# Patient Record
Sex: FEMALE | Race: WHITE | NOT HISPANIC OR LATINO
[De-identification: names, ages, dates, MRNs, and addresses within clinical notes are randomized per-mention and may not be internally consistent; named-entity substitution may affect disease eponyms.]

---

## 2017-01-06 VITALS
RESPIRATION RATE: 18 BRPM | OXYGEN SATURATION: 97 % | HEIGHT: 63 IN | HEART RATE: 73 BPM | SYSTOLIC BLOOD PRESSURE: 121 MMHG | TEMPERATURE: 98 F | DIASTOLIC BLOOD PRESSURE: 71 MMHG | WEIGHT: 197.98 LBS

## 2017-01-06 NOTE — ASU PATIENT PROFILE, ADULT - PSH
H/O arthroscopy of right knee    H/O breast surgery  right, benign H/O arthroscopy of right knee    H/O breast surgery  left

## 2017-01-08 ENCOUNTER — FORM ENCOUNTER (OUTPATIENT)
Age: 67
End: 2017-01-08

## 2017-01-09 ENCOUNTER — OUTPATIENT (OUTPATIENT)
Dept: OUTPATIENT SERVICES | Facility: HOSPITAL | Age: 67
LOS: 1 days | Discharge: HOME | End: 2017-01-09
Payer: COMMERCIAL

## 2017-01-09 VITALS
DIASTOLIC BLOOD PRESSURE: 77 MMHG | RESPIRATION RATE: 12 BRPM | OXYGEN SATURATION: 96 % | HEART RATE: 72 BPM | SYSTOLIC BLOOD PRESSURE: 123 MMHG

## 2017-01-09 DIAGNOSIS — Z98.890 OTHER SPECIFIED POSTPROCEDURAL STATES: Chronic | ICD-10-CM

## 2017-01-09 PROCEDURE — 88341 IMHCHEM/IMCYTCHM EA ADD ANTB: CPT

## 2017-01-09 PROCEDURE — 19285 PERQ DEV BREAST 1ST US IMAG: CPT | Mod: LT

## 2017-01-09 PROCEDURE — 19125 EXCISION BREAST LESION: CPT | Mod: LT,GC

## 2017-01-09 PROCEDURE — G0206: CPT | Mod: 26

## 2017-01-09 PROCEDURE — 76098 X-RAY EXAM SURGICAL SPECIMEN: CPT

## 2017-01-09 PROCEDURE — 19125 EXCISION BREAST LESION: CPT | Mod: LT

## 2017-01-09 PROCEDURE — C1819: CPT

## 2017-01-09 PROCEDURE — 76098 X-RAY EXAM SURGICAL SPECIMEN: CPT | Mod: 26

## 2017-01-09 PROCEDURE — 19285 PERQ DEV BREAST 1ST US IMAG: CPT

## 2017-01-09 PROCEDURE — 77065 DX MAMMO INCL CAD UNI: CPT

## 2017-01-09 PROCEDURE — 88307 TISSUE EXAM BY PATHOLOGIST: CPT

## 2017-01-09 RX ORDER — ONDANSETRON 8 MG/1
4 TABLET, FILM COATED ORAL ONCE
Qty: 0 | Refills: 0 | Status: DISCONTINUED | OUTPATIENT
Start: 2017-01-09 | End: 2017-01-09

## 2017-01-11 DIAGNOSIS — D24.2 BENIGN NEOPLASM OF LEFT BREAST: ICD-10-CM

## 2017-01-11 DIAGNOSIS — N60.12 DIFFUSE CYSTIC MASTOPATHY OF LEFT BREAST: ICD-10-CM

## 2017-01-11 LAB — SURGICAL PATHOLOGY STUDY: SIGNIFICANT CHANGE UP

## 2017-01-13 ENCOUNTER — RESULT REVIEW (OUTPATIENT)
Age: 67
End: 2017-01-13

## 2017-01-18 ENCOUNTER — APPOINTMENT (OUTPATIENT)
Dept: BREAST CENTER | Facility: CLINIC | Age: 67
End: 2017-01-18

## 2017-01-18 VITALS — TEMPERATURE: 88 F | SYSTOLIC BLOOD PRESSURE: 127 MMHG | DIASTOLIC BLOOD PRESSURE: 88 MMHG

## 2017-01-18 DIAGNOSIS — R92.8 OTHER ABNORMAL AND INCONCLUSIVE FINDINGS ON DIAGNOSTIC IMAGING OF BREAST: ICD-10-CM

## 2017-01-18 DIAGNOSIS — N63 UNSPECIFIED LUMP IN BREAST: ICD-10-CM

## 2017-02-20 PROBLEM — E03.9 HYPOTHYROIDISM, UNSPECIFIED: Chronic | Status: ACTIVE | Noted: 2017-01-06

## 2017-02-20 PROBLEM — M85.80 OTHER SPECIFIED DISORDERS OF BONE DENSITY AND STRUCTURE, UNSPECIFIED SITE: Chronic | Status: ACTIVE | Noted: 2017-01-06

## 2017-02-20 PROBLEM — I10 ESSENTIAL (PRIMARY) HYPERTENSION: Chronic | Status: ACTIVE | Noted: 2017-01-06

## 2017-05-08 ENCOUNTER — FORM ENCOUNTER (OUTPATIENT)
Age: 67
End: 2017-05-08

## 2017-05-09 ENCOUNTER — OUTPATIENT (OUTPATIENT)
Dept: OUTPATIENT SERVICES | Facility: HOSPITAL | Age: 67
LOS: 1 days | End: 2017-05-09
Payer: COMMERCIAL

## 2017-05-09 DIAGNOSIS — Z98.890 OTHER SPECIFIED POSTPROCEDURAL STATES: Chronic | ICD-10-CM

## 2017-05-09 PROCEDURE — 76641 ULTRASOUND BREAST COMPLETE: CPT | Mod: 26,50

## 2017-05-09 PROCEDURE — 76641 ULTRASOUND BREAST COMPLETE: CPT

## 2017-07-11 ENCOUNTER — APPOINTMENT (OUTPATIENT)
Dept: BREAST CENTER | Facility: CLINIC | Age: 67
End: 2017-07-11

## 2017-07-11 VITALS
TEMPERATURE: 96.9 F | WEIGHT: 195 LBS | HEIGHT: 62 IN | HEART RATE: 78 BPM | BODY MASS INDEX: 35.88 KG/M2 | SYSTOLIC BLOOD PRESSURE: 131 MMHG | DIASTOLIC BLOOD PRESSURE: 88 MMHG | RESPIRATION RATE: 16 BRPM

## 2017-07-11 DIAGNOSIS — Z12.39 ENCOUNTER FOR OTHER SCREENING FOR MALIGNANT NEOPLASM OF BREAST: ICD-10-CM

## 2017-12-18 ENCOUNTER — FORM ENCOUNTER (OUTPATIENT)
Age: 67
End: 2017-12-18

## 2017-12-19 ENCOUNTER — OUTPATIENT (OUTPATIENT)
Dept: OUTPATIENT SERVICES | Facility: HOSPITAL | Age: 67
LOS: 1 days | End: 2017-12-19
Payer: COMMERCIAL

## 2017-12-19 DIAGNOSIS — Z98.890 OTHER SPECIFIED POSTPROCEDURAL STATES: Chronic | ICD-10-CM

## 2017-12-19 PROCEDURE — 76641 ULTRASOUND BREAST COMPLETE: CPT

## 2017-12-19 PROCEDURE — 77066 DX MAMMO INCL CAD BI: CPT

## 2017-12-19 PROCEDURE — 76641 ULTRASOUND BREAST COMPLETE: CPT | Mod: 26,50

## 2017-12-19 PROCEDURE — G0204: CPT | Mod: 26

## 2019-02-05 ENCOUNTER — APPOINTMENT (OUTPATIENT)
Dept: MAMMOGRAPHY | Facility: HOSPITAL | Age: 69
End: 2019-02-05

## 2019-05-22 ENCOUNTER — APPOINTMENT (OUTPATIENT)
Dept: MAMMOGRAPHY | Facility: HOSPITAL | Age: 69
End: 2019-05-22
Payer: MEDICARE

## 2019-05-22 ENCOUNTER — OUTPATIENT (OUTPATIENT)
Dept: OUTPATIENT SERVICES | Facility: HOSPITAL | Age: 69
LOS: 1 days | End: 2019-05-22
Payer: MEDICARE

## 2019-05-22 DIAGNOSIS — Z98.890 OTHER SPECIFIED POSTPROCEDURAL STATES: Chronic | ICD-10-CM

## 2019-05-22 PROCEDURE — 77067 SCR MAMMO BI INCL CAD: CPT

## 2019-05-22 PROCEDURE — 77063 BREAST TOMOSYNTHESIS BI: CPT | Mod: 26

## 2019-05-22 PROCEDURE — 77067 SCR MAMMO BI INCL CAD: CPT | Mod: 26

## 2019-05-22 PROCEDURE — 77063 BREAST TOMOSYNTHESIS BI: CPT

## 2019-06-06 ENCOUNTER — OUTPATIENT (OUTPATIENT)
Dept: OUTPATIENT SERVICES | Facility: HOSPITAL | Age: 69
LOS: 1 days | End: 2019-06-06
Payer: MEDICARE

## 2019-06-06 ENCOUNTER — APPOINTMENT (OUTPATIENT)
Dept: MAMMOGRAPHY | Facility: HOSPITAL | Age: 69
End: 2019-06-06
Payer: MEDICARE

## 2019-06-06 ENCOUNTER — APPOINTMENT (OUTPATIENT)
Dept: ULTRASOUND IMAGING | Facility: HOSPITAL | Age: 69
End: 2019-06-06
Payer: MEDICARE

## 2019-06-06 DIAGNOSIS — Z98.890 OTHER SPECIFIED POSTPROCEDURAL STATES: Chronic | ICD-10-CM

## 2019-06-06 PROCEDURE — 76641 ULTRASOUND BREAST COMPLETE: CPT | Mod: 26,50

## 2019-06-06 PROCEDURE — 76641 ULTRASOUND BREAST COMPLETE: CPT

## 2019-06-06 PROCEDURE — 77066 DX MAMMO INCL CAD BI: CPT | Mod: 26

## 2019-06-06 PROCEDURE — 77066 DX MAMMO INCL CAD BI: CPT

## 2019-09-13 ENCOUNTER — OUTPATIENT (OUTPATIENT)
Dept: OUTPATIENT SERVICES | Facility: HOSPITAL | Age: 69
LOS: 1 days | End: 2019-09-13
Payer: MEDICARE

## 2019-09-13 ENCOUNTER — RESULT REVIEW (OUTPATIENT)
Age: 69
End: 2019-09-13

## 2019-09-13 ENCOUNTER — APPOINTMENT (OUTPATIENT)
Dept: MAMMOGRAPHY | Facility: HOSPITAL | Age: 69
End: 2019-09-13
Payer: MEDICARE

## 2019-09-13 DIAGNOSIS — Z98.890 OTHER SPECIFIED POSTPROCEDURAL STATES: Chronic | ICD-10-CM

## 2019-09-13 PROCEDURE — 88305 TISSUE EXAM BY PATHOLOGIST: CPT

## 2019-09-13 PROCEDURE — 19082 BX BREAST ADD LESION STRTCTC: CPT | Mod: LT

## 2019-09-13 PROCEDURE — 19082 BX BREAST ADD LESION STRTCTC: CPT | Mod: RT

## 2019-09-13 PROCEDURE — 19082 BX BREAST ADD LESION STRTCTC: CPT

## 2019-09-13 PROCEDURE — 19081 BX BREAST 1ST LESION STRTCTC: CPT | Mod: LT

## 2019-09-13 PROCEDURE — 19081 BX BREAST 1ST LESION STRTCTC: CPT

## 2019-09-13 PROCEDURE — A4648: CPT

## 2019-09-16 LAB — SURGICAL PATHOLOGY STUDY: SIGNIFICANT CHANGE UP

## 2019-09-25 ENCOUNTER — APPOINTMENT (OUTPATIENT)
Dept: BREAST CENTER | Facility: CLINIC | Age: 69
End: 2019-09-25
Payer: MEDICARE

## 2019-09-25 DIAGNOSIS — R92.8 OTHER ABNORMAL AND INCONCLUSIVE FINDINGS ON DIAGNOSTIC IMAGING OF BREAST: ICD-10-CM

## 2019-09-25 DIAGNOSIS — Z78.9 OTHER SPECIFIED HEALTH STATUS: ICD-10-CM

## 2019-09-25 PROCEDURE — 99213 OFFICE O/P EST LOW 20 MIN: CPT

## 2019-09-25 NOTE — PHYSICAL EXAM
[Normocephalic] : normocephalic [Atraumatic] : atraumatic [Supple] : supple [No Supraclavicular Adenopathy] : no supraclavicular adenopathy [Examined in the supine and seated position] : examined in the supine and seated position [No Nipple Retraction] : no left nipple retraction [No Nipple Discharge] : no right nipple discharge [No Axillary Lymphadenopathy] : no left axillary lymphadenopathy [No Edema] : no edema [No Rashes] : no rashes [No Ulceration] : no ulceration

## 2019-10-01 ENCOUNTER — OUTPATIENT (OUTPATIENT)
Dept: OUTPATIENT SERVICES | Facility: HOSPITAL | Age: 69
LOS: 1 days | End: 2019-10-01

## 2019-10-01 ENCOUNTER — APPOINTMENT (OUTPATIENT)
Dept: MRI IMAGING | Facility: CLINIC | Age: 69
End: 2019-10-01
Payer: MEDICARE

## 2019-10-01 DIAGNOSIS — Z98.890 OTHER SPECIFIED POSTPROCEDURAL STATES: Chronic | ICD-10-CM

## 2019-10-01 PROCEDURE — 77049 MRI BREAST C-+ W/CAD BI: CPT | Mod: 26

## 2019-10-03 NOTE — REASON FOR VISIT
[Consultation] : a consultation visit [FreeTextEntry1] : new left breast sclerosing intraductal papilloma found on screening mammogram, hx of atypia

## 2019-10-03 NOTE — HISTORY OF PRESENT ILLNESS
[FreeTextEntry1] : Yovana is a 69 yo female, previously under the care of Dr. Sood, presents for a new finding of left breast sclerosed intraductal papilloma found on screening mammogram. She is without complaint. Patient does know that she has nodular breasts and does self breast exams. ETHAN 42%\par \par She has a past medical history of left breast excisional biopsy in 2017 for atypia, path revealed papilloma.\par She tried Tamoxifen but at that time she had a complicated course of thyroid disease and decided to stop the Tamoxifen.\par

## 2019-10-03 NOTE — PAST MEDICAL HISTORY
[Menopause Age____] : age at menopause was [unfilled] [Live Births ___] : P[unfilled]  [Menarche Age ____] : age at menarche was [unfilled] [Age At Live Birth ___] : Age at live birth: [unfilled] [Total Preg ___] : G[unfilled] [History of Hormone Replacement Treatment] : has no history of hormone replacement treatment [FreeTextEntry7] : none

## 2019-10-08 ENCOUNTER — CHART COPY (OUTPATIENT)
Age: 69
End: 2019-10-08

## 2019-10-08 PROBLEM — R92.8 ABNORMAL FINDING ON RADIOLOGICAL EXAMINATION OF BREAST: Status: ACTIVE | Noted: 2019-10-08

## 2019-10-28 ENCOUNTER — CHART COPY (OUTPATIENT)
Age: 69
End: 2019-10-28

## 2019-11-04 ENCOUNTER — APPOINTMENT (OUTPATIENT)
Dept: ULTRASOUND IMAGING | Facility: HOSPITAL | Age: 69
End: 2019-11-04
Payer: MEDICARE

## 2019-11-04 ENCOUNTER — OUTPATIENT (OUTPATIENT)
Dept: OUTPATIENT SERVICES | Facility: HOSPITAL | Age: 69
LOS: 1 days | End: 2019-11-04
Payer: MEDICARE

## 2019-11-04 DIAGNOSIS — Z98.890 OTHER SPECIFIED POSTPROCEDURAL STATES: Chronic | ICD-10-CM

## 2019-11-04 PROCEDURE — 76642 ULTRASOUND BREAST LIMITED: CPT | Mod: 26,50

## 2019-11-04 PROCEDURE — 76642 ULTRASOUND BREAST LIMITED: CPT

## 2019-11-05 ENCOUNTER — RESULT REVIEW (OUTPATIENT)
Age: 69
End: 2019-11-05

## 2019-11-20 ENCOUNTER — CHART COPY (OUTPATIENT)
Age: 69
End: 2019-11-20

## 2019-12-02 ENCOUNTER — OUTPATIENT (OUTPATIENT)
Dept: OUTPATIENT SERVICES | Facility: HOSPITAL | Age: 69
LOS: 1 days | End: 2019-12-02
Payer: MEDICARE

## 2019-12-02 ENCOUNTER — APPOINTMENT (OUTPATIENT)
Dept: MAMMOGRAPHY | Facility: HOSPITAL | Age: 69
End: 2019-12-02
Payer: MEDICARE

## 2019-12-02 DIAGNOSIS — Z98.890 OTHER SPECIFIED POSTPROCEDURAL STATES: Chronic | ICD-10-CM

## 2019-12-02 PROCEDURE — 19281 PERQ DEVICE BREAST 1ST IMAG: CPT | Mod: LT

## 2019-12-02 PROCEDURE — 19281 PERQ DEVICE BREAST 1ST IMAG: CPT

## 2019-12-02 PROCEDURE — C1739: CPT

## 2019-12-05 VITALS
DIASTOLIC BLOOD PRESSURE: 83 MMHG | RESPIRATION RATE: 16 BRPM | WEIGHT: 193.57 LBS | HEART RATE: 73 BPM | SYSTOLIC BLOOD PRESSURE: 129 MMHG | HEIGHT: 63 IN | TEMPERATURE: 97 F | OXYGEN SATURATION: 97 %

## 2019-12-05 NOTE — ASU PATIENT PROFILE, ADULT - PSH
H/O arthroscopy of right knee    H/O breast surgery  left Cataract of both eyes, unspecified cataract type  lens implant  H/O arthroscopy of right knee    H/O breast surgery  left

## 2019-12-06 ENCOUNTER — OUTPATIENT (OUTPATIENT)
Dept: OUTPATIENT SERVICES | Facility: HOSPITAL | Age: 69
LOS: 1 days | Discharge: HOME | End: 2019-12-06
Payer: MEDICARE

## 2019-12-06 ENCOUNTER — APPOINTMENT (OUTPATIENT)
Dept: BREAST CENTER | Facility: CLINIC | Age: 69
End: 2019-12-06

## 2019-12-06 ENCOUNTER — RESULT REVIEW (OUTPATIENT)
Age: 69
End: 2019-12-06

## 2019-12-06 VITALS
DIASTOLIC BLOOD PRESSURE: 78 MMHG | SYSTOLIC BLOOD PRESSURE: 148 MMHG | RESPIRATION RATE: 16 BRPM | OXYGEN SATURATION: 91 % | HEART RATE: 68 BPM

## 2019-12-06 DIAGNOSIS — H26.9 UNSPECIFIED CATARACT: Chronic | ICD-10-CM

## 2019-12-06 DIAGNOSIS — Z98.890 OTHER SPECIFIED POSTPROCEDURAL STATES: Chronic | ICD-10-CM

## 2019-12-06 PROCEDURE — 76098 X-RAY EXAM SURGICAL SPECIMEN: CPT

## 2019-12-06 PROCEDURE — 19120 REMOVAL OF BREAST LESION: CPT | Mod: LT

## 2019-12-06 PROCEDURE — 76098 X-RAY EXAM SURGICAL SPECIMEN: CPT | Mod: 26

## 2019-12-06 PROCEDURE — 88307 TISSUE EXAM BY PATHOLOGIST: CPT

## 2019-12-06 PROCEDURE — 88307 TISSUE EXAM BY PATHOLOGIST: CPT | Mod: 26

## 2019-12-06 PROCEDURE — 19125 EXCISION BREAST LESION: CPT | Mod: LT,GC

## 2019-12-06 RX ORDER — ACETAMINOPHEN 500 MG
1000 TABLET ORAL ONCE
Refills: 0 | Status: COMPLETED | OUTPATIENT
Start: 2019-12-06 | End: 2019-12-06

## 2019-12-06 RX ORDER — OXYCODONE AND ACETAMINOPHEN 5; 325 MG/1; MG/1
1 TABLET ORAL ONCE
Refills: 0 | Status: DISCONTINUED | OUTPATIENT
Start: 2019-12-06 | End: 2019-12-06

## 2019-12-06 RX ADMIN — OXYCODONE AND ACETAMINOPHEN 1 TABLET(S): 5; 325 TABLET ORAL at 19:41

## 2019-12-06 RX ADMIN — Medication 1000 MILLIGRAM(S): at 14:33

## 2019-12-06 NOTE — BRIEF OPERATIVE NOTE - OPERATION/FINDINGS
Left breast lumpectomy with jing , clip identified in specimen. Breast closed with deep dermal sutures.

## 2019-12-06 NOTE — PACU DISCHARGE NOTE - COMMENTS
discharge instructions given to patient who verbalized understanding, pain controlled with regimen, cleared by anesthesiologist for discharge home.

## 2019-12-10 LAB — SURGICAL PATHOLOGY STUDY: SIGNIFICANT CHANGE UP

## 2019-12-11 ENCOUNTER — APPOINTMENT (OUTPATIENT)
Dept: BREAST CENTER | Facility: CLINIC | Age: 69
End: 2019-12-11
Payer: MEDICARE

## 2019-12-11 VITALS
TEMPERATURE: 98.3 F | OXYGEN SATURATION: 95 % | SYSTOLIC BLOOD PRESSURE: 120 MMHG | DIASTOLIC BLOOD PRESSURE: 84 MMHG | HEART RATE: 107 BPM

## 2019-12-11 PROCEDURE — 99024 POSTOP FOLLOW-UP VISIT: CPT

## 2019-12-11 NOTE — DATA REVIEWED
[FreeTextEntry1] : --6/6/2019 ([facility]) b/l mmg/US: BI-RADS 4\par \par --12/6/19 ([facility]) left breast excisional biopsy pathology biopsy site changes, focal stromal fibrosis, UDH, cystic change, no residual tumor seen, left breast tissue inferior to lesion pathology cystic change with apocrine metaplasia, UDH, focal sclerosing adenosis.

## 2019-12-11 NOTE — HISTORY OF PRESENT ILLNESS
[FreeTextEntry1] : Yovana is a 68 yo female presents for post-op follow up of left intraductal papilloma s/p left breast excisional biopsy on 12/6/19.  Final surgical pathology revealed benign findings with biopsy site changes. \par Patient doing well. No postoperative issues. Denies fever and chills.\par \par

## 2020-05-06 ENCOUNTER — APPOINTMENT (OUTPATIENT)
Dept: BREAST CENTER | Facility: CLINIC | Age: 70
End: 2020-05-06
Payer: MEDICARE

## 2020-05-06 PROCEDURE — 99213 OFFICE O/P EST LOW 20 MIN: CPT | Mod: 95

## 2020-05-11 NOTE — HISTORY OF PRESENT ILLNESS
[FreeTextEntry1] : 68 yo female presents for follow up of left intraductal papilloma s/p left breast excisional biopsy on 12/6/19 Final surgical pathology revealed benign findings with biopsy site changes. Also presents for high risk surveillance. \par \par Patient doing well. No postoperative issues. Denies fever and chills. Patient has no breast complaints. \par \par Discussed with patient that a physical exam is an important part of the visit and that we will be scheduling an appointment for the physical exam in the next couple of months. Patient states that she is hesitant until it truly feels safe in regards to COVID19.

## 2020-07-20 ENCOUNTER — APPOINTMENT (OUTPATIENT)
Dept: BREAST CENTER | Facility: CLINIC | Age: 70
End: 2020-07-20
Payer: MEDICARE

## 2020-07-20 PROCEDURE — 99212 OFFICE O/P EST SF 10 MIN: CPT | Mod: 95

## 2020-07-22 NOTE — PAST MEDICAL HISTORY
[Menarche Age ____] : age at menarche was [unfilled] [Menopause Age____] : age at menopause was [unfilled] [Age At Live Birth ___] : Age at live birth: [unfilled] [Total Preg ___] : G[unfilled] [Live Births ___] : P[unfilled]  [History of Hormone Replacement Treatment] : has no history of hormone replacement treatment [FreeTextEntry7] : none

## 2020-07-22 NOTE — REASON FOR VISIT
[Home] : at home, [unfilled] , at the time of the visit. [Follow-Up: _____] : a [unfilled] follow-up visit [Other Location: e.g. Home (Enter Location, City,State)___] : at [unfilled] [Verbal consent obtained from patient] : the patient, [unfilled]

## 2020-07-22 NOTE — DATA REVIEWED
[FreeTextEntry1] : --6/6/2019 ([facility]) b/l mmg/US: BI-RADS 4\par \par -- 10/1/19 ([facility]) MRI breasts: 0.6cm enhancing mass in the right breast at 6:00, recommend US w/ biopsy vs MRI biopsy. Foci of non-mass enhancement in the left breast at 5-6:00, consider MRI guided biopsy. Non-mass enhancement surround the biopsy clips in the left breast, likely due to post-biopsy change. BI-RADS 4A. \par \par -- 11/4/19 ([facility]) B/l targeted US: unremarkable US images of both breasts, no US abnormality seen to correspond with lesions seen on MRI. MRI biopsy is recommended. \par \par --12/6/19 ([facility]) left breast excisional biopsy pathology biopsy site changes, focal stromal fibrosis, UDH, cystic change, no residual tumor seen, left breast tissue inferior to lesion pathology cystic change with apocrine metaplasia, UDH, focal sclerosing adenosis. \par \par Plan is to f/u on lesions seen on previous MRI (biopsy was recommended) from October with MRI imaging in 6-months (due now)?? \par

## 2020-07-22 NOTE — HISTORY OF PRESENT ILLNESS
[FreeTextEntry1] : 68 yo female presents for follow up Telehealth visit of left intraductal papilloma s/p left breast excisional biopsy on 12/6/19 Final surgical pathology revealed benign findings with biopsy site changes. Also presents for high risk surveillance. Patient has been hesitant for physical exam follow up secondary to COVID-19. Previously discussed that the physical exam is an essential part of her visit and is indicated.\par \par Patient is also hesitant about imaging, discussed with her that the facilities have protocols in place to keep patients safe. Patient understands that she is overdue for imaging, and is willing to come in eventually for a physical exam.\par \par ETHAN 42%

## 2020-08-24 ENCOUNTER — RESULT REVIEW (OUTPATIENT)
Age: 70
End: 2020-08-24

## 2020-08-24 ENCOUNTER — APPOINTMENT (OUTPATIENT)
Dept: BREAST CENTER | Facility: CLINIC | Age: 70
End: 2020-08-24
Payer: MEDICARE

## 2020-08-24 ENCOUNTER — APPOINTMENT (OUTPATIENT)
Dept: MAMMOGRAPHY | Facility: HOSPITAL | Age: 70
End: 2020-08-24
Payer: MEDICARE

## 2020-08-24 ENCOUNTER — OUTPATIENT (OUTPATIENT)
Dept: OUTPATIENT SERVICES | Facility: HOSPITAL | Age: 70
LOS: 1 days | End: 2020-08-24
Payer: MEDICARE

## 2020-08-24 ENCOUNTER — APPOINTMENT (OUTPATIENT)
Dept: ULTRASOUND IMAGING | Facility: HOSPITAL | Age: 70
End: 2020-08-24
Payer: MEDICARE

## 2020-08-24 VITALS
WEIGHT: 190 LBS | DIASTOLIC BLOOD PRESSURE: 72 MMHG | HEIGHT: 62 IN | HEART RATE: 92 BPM | SYSTOLIC BLOOD PRESSURE: 121 MMHG | TEMPERATURE: 97.2 F | OXYGEN SATURATION: 97 % | BODY MASS INDEX: 34.96 KG/M2 | RESPIRATION RATE: 20 BRPM

## 2020-08-24 DIAGNOSIS — Z98.890 OTHER SPECIFIED POSTPROCEDURAL STATES: Chronic | ICD-10-CM

## 2020-08-24 DIAGNOSIS — H26.9 UNSPECIFIED CATARACT: Chronic | ICD-10-CM

## 2020-08-24 PROCEDURE — 99213 OFFICE O/P EST LOW 20 MIN: CPT

## 2020-08-24 PROCEDURE — 77066 DX MAMMO INCL CAD BI: CPT

## 2020-08-24 PROCEDURE — G0279: CPT | Mod: 26

## 2020-08-24 PROCEDURE — 76641 ULTRASOUND BREAST COMPLETE: CPT | Mod: 26,50

## 2020-08-24 PROCEDURE — G0279: CPT

## 2020-08-24 PROCEDURE — 76641 ULTRASOUND BREAST COMPLETE: CPT

## 2020-08-24 PROCEDURE — 77066 DX MAMMO INCL CAD BI: CPT | Mod: 26

## 2020-08-27 NOTE — PHYSICAL EXAM
[Normocephalic] : normocephalic [Atraumatic] : atraumatic [Supple] : supple [No Supraclavicular Adenopathy] : no supraclavicular adenopathy [Examined in the supine and seated position] : examined in the supine and seated position [No dominant masses] : no dominant masses in right breast  [No dominant masses] : no dominant masses left breast [No Nipple Retraction] : no left nipple retraction [No Nipple Discharge] : no left nipple discharge [No Axillary Lymphadenopathy] : no left axillary lymphadenopathy [No Ulceration] : no ulceration [No Rashes] : no rashes [No Edema] : no edema

## 2020-09-21 ENCOUNTER — RESULT REVIEW (OUTPATIENT)
Age: 70
End: 2020-09-21

## 2020-09-21 ENCOUNTER — OUTPATIENT (OUTPATIENT)
Dept: OUTPATIENT SERVICES | Facility: HOSPITAL | Age: 70
LOS: 1 days | End: 2020-09-21
Payer: MEDICARE

## 2020-09-21 ENCOUNTER — APPOINTMENT (OUTPATIENT)
Dept: MAMMOGRAPHY | Facility: HOSPITAL | Age: 70
End: 2020-09-21
Payer: MEDICARE

## 2020-09-21 DIAGNOSIS — H26.9 UNSPECIFIED CATARACT: Chronic | ICD-10-CM

## 2020-09-21 DIAGNOSIS — Z98.890 OTHER SPECIFIED POSTPROCEDURAL STATES: Chronic | ICD-10-CM

## 2020-09-21 PROCEDURE — 88305 TISSUE EXAM BY PATHOLOGIST: CPT | Mod: 26

## 2020-09-21 PROCEDURE — 19081 BX BREAST 1ST LESION STRTCTC: CPT | Mod: RT

## 2020-09-21 PROCEDURE — 88305 TISSUE EXAM BY PATHOLOGIST: CPT

## 2020-09-21 PROCEDURE — 19081 BX BREAST 1ST LESION STRTCTC: CPT

## 2020-09-21 PROCEDURE — A4648: CPT

## 2020-09-21 PROCEDURE — 77065 DX MAMMO INCL CAD UNI: CPT

## 2020-09-21 PROCEDURE — 77065 DX MAMMO INCL CAD UNI: CPT | Mod: 26,RT

## 2020-09-22 LAB — SURGICAL PATHOLOGY STUDY: SIGNIFICANT CHANGE UP

## 2020-09-24 NOTE — HISTORY OF PRESENT ILLNESS
[FreeTextEntry1] : Yovana is a 70 yo female presents for physical exam appointment s/p telehealth, for history of left intraductal papilloma s/p left breast excisional biopsy on 12/6/19 Final surgical pathology revealed benign findings with biopsy site changes. Also presents for high risk surveillance. Patient has no breast complaints today. Denies nipple discharge, nipple/breast skin changes or dimpling. Denies fever and chills.\par \par She had her appointment for a b/l mmg and US at [facility] today. Patient understands and will go forward with the biopsy.\par \par ETHAN lifetime risk is 42%

## 2020-09-24 NOTE — PAST MEDICAL HISTORY
[History of Hormone Replacement Treatment] : has no history of hormone replacement treatment [FreeTextEntry7] : none

## 2020-09-24 NOTE — DATA REVIEWED
[FreeTextEntry1] : --6/6/2019 ([facility]) b/l mmg/US: BI-RADS 4\par \par -- 10/1/19 ([facility]) MRI breasts: 0.6cm enhancing mass in the right breast at 6:00, recommend US w/ biopsy vs MRI biopsy. Foci of non-mass enhancement in the left breast at 5-6:00, consider MRI guided biopsy. Non-mass enhancement surround the biopsy clips in the left breast, likely due to post-biopsy change. BI-RADS 4A. \par \par -- 11/4/19 ([facility]) B/l targeted US: unremarkable US images of both breasts, no US abnormality seen to correspond with lesions seen on MRI. MRI biopsy is recommended. \par \par --12/6/19 ([facility]) left breast excisional biopsy pathology biopsy site changes, focal stromal fibrosis, UDH, cystic change, no residual tumor seen, left breast tissue inferior to lesion pathology cystic change with apocrine metaplasia, UDH, focal sclerosing adenosis. \par \par

## 2020-09-25 ENCOUNTER — APPOINTMENT (OUTPATIENT)
Dept: VASCULAR SURGERY | Facility: CLINIC | Age: 70
End: 2020-09-25
Payer: MEDICARE

## 2020-09-25 DIAGNOSIS — I87.2 VENOUS INSUFFICIENCY (CHRONIC) (PERIPHERAL): ICD-10-CM

## 2020-09-25 DIAGNOSIS — I80.3 PHLEBITIS AND THROMBOPHLEBITIS OF LOWER EXTREMITIES, UNSPECIFIED: ICD-10-CM

## 2020-09-25 PROCEDURE — 93971 EXTREMITY STUDY: CPT

## 2020-09-25 PROCEDURE — 99203 OFFICE O/P NEW LOW 30 MIN: CPT

## 2020-09-25 NOTE — ADDENDUM
[FreeTextEntry1] : This note was written by Jeane Partida on 09/25/2020 acting as scribe for Hemant Bunch M.D.\par \par I, Hemant Bunch, have read and attest that all the information, medical decision making and discharge instructions within are true and accurate. 
84

## 2020-09-25 NOTE — ASSESSMENT
[FreeTextEntry1] : 70 y/o F presents for initial evaluation of RLE thrombophlebitis. Upon examination: Bulging varicose veins over the R thigh through the R calf. \par RLE doppler done at the office today: Negative DVT, Positive SVT chronic VV in the calf, GSV Reflux. \par Discussed with patient her symptoms are secondary to varicose veins. At this point RLE RFA warranted, discussed risks and benefits. All questions made by the patient were answered to her satisfaction. Procedure to be scheduled in 6 weeks. Discussed possible RLE stab phlebectomy in the near future . Once procedure approved by insurance will proceed accordingly. \par

## 2020-09-25 NOTE — PHYSICAL EXAM
[Respiratory Effort] : normal respiratory effort [Normal Rate and Rhythm] : normal rate and rhythm [Alert] : alert [Oriented to Person] : oriented to person [Oriented to Place] : oriented to place [Oriented to Time] : oriented to time [Calm] : calm [Varicose Veins Of Lower Extremities] : present [Varicose Veins Of The Right Leg] : of the right leg [Ankle Swelling On The Left] : moderate [JVD] : no jugular venous distention  [Abdomen Tenderness] : ~T ~M No abdominal tenderness [de-identified] : Calm, cooperative [de-identified] : NC/AT [de-identified] : FROM + [de-identified] : Bulging varicose veins over the R thigh through the R calf.

## 2020-09-25 NOTE — PROCEDURE
[FreeTextEntry1] : RLE doppler done at the office today: Negative DVT, Positive SVT chronic VV in the calf, GSV Reflux.

## 2020-09-25 NOTE — HISTORY OF PRESENT ILLNESS
[FreeTextEntry1] : 68 y/o F w/hx HLD, HTN and Left intraductal papilloma s//p Left breast excisional biopsy s/p benign findings presents for initial evaluation of thrombophlebitis of the RLE x 2 months ago.  Patient was referred by Dr. Zurita to r/o underlying DVT. She was placed on baby aspirin but had to stop for L breast biopsy x 1 week. \par \par Denies: rest pain, skin changes, ulcerations. \par \par SHx:phakectomy,

## 2020-11-11 ENCOUNTER — APPOINTMENT (OUTPATIENT)
Dept: BREAST CENTER | Facility: CLINIC | Age: 70
End: 2020-11-11

## 2020-12-07 ENCOUNTER — APPOINTMENT (OUTPATIENT)
Dept: VASCULAR SURGERY | Facility: CLINIC | Age: 70
End: 2020-12-07
Payer: MEDICARE

## 2020-12-07 PROCEDURE — 36475 ENDOVENOUS RF 1ST VEIN: CPT | Mod: RT

## 2020-12-07 NOTE — PROCEDURE
[FreeTextEntry1] : RLE RFA [FreeTextEntry2] : RLE GSV reflux  [FreeTextEntry3] : Surgeon: Hemant Roberts MD\par \par Assistant: Rupa Singh RVT\par \par Pre-Op Diagnosis:  Incompetent Right Greater Saphenous Vein\par \par Post-Op Diagnosis:  Same\par \par Procedure:  Transcatheter Occlusion of Right Greater Saphenous Vein using a Radio- Frequency Thermal Ablation (VNUS) ClosureFAST Catheter.\par \par Anesthesia: Local \par \par History: Symptomatic varicose veins\par \par Findings:  Complete occlusion of greater saphenous vein at end of procedure.\par \par Procedure: The patient’s leg was prepped and draped.  Under local 1% Lidocaine the greater saphenous vein was punctured below the knee with a micropuncture needle and then the guidewire was inserted into the vein.  This was performed under ultrasound guidance.  The skin was incised with a #11 Blade, a dilator was inserted and then the 7 Fr. Introducer was placed into the greater saphenous vein.  \par \par The fossa ovalis was visualized with the Duplex scan.  The common femoral vein was confirmed to be patent.  Duplex examination of the greater saphenous vein from the calf to the groin was performed.  The greater saphenous and inferior epigastric vein were identified and the VNUS catheter was introduced through the introducer and into the vein up to the fossa ovalis.  It was positioned 3 cm distal to the inferior epigastric vein.\par \par Tumescent solution (400 cc normal saline, 4cc of 8.4% Sodium bicarbonate and 40 cc 1% Lidoaine) was infiltrated subfascially over the vein.  \par \par The VNUS ClosureFAST catheter checked for proper function.  Thermal ablation was begun after the position of the catheter was once again confirmed to be 3 cm distal to the inferior epigastric branch of the greater saphenous vein. The proximal 7 cm was treated twice and then the rest of the vein was treated with single 20 sec cycles. The sheath and catheter were removed. Compression was applied for hemostasis.    \par \par Confirmation of common femoral vein patency and occlusion of the greater saphenous vein was made with duplex ultrasonography.  \par \par The leg was wrapped with gauze and Ace Bandages.  The patient remained on the table until alert and was then comfortable ambulating.           \par \par

## 2020-12-07 NOTE — ADDENDUM
[FreeTextEntry1] : This note was written by Jeane Partida on 12/07/2020 acting as scribe for Hemant Waggoner M.D.\par \par I, Hemant Bunch have read and attest that all the information, medical decision making and discharge instructions within are true and accurate.

## 2020-12-11 ENCOUNTER — APPOINTMENT (OUTPATIENT)
Dept: VASCULAR SURGERY | Facility: CLINIC | Age: 70
End: 2020-12-11
Payer: MEDICARE

## 2020-12-11 PROCEDURE — 99213 OFFICE O/P EST LOW 20 MIN: CPT

## 2020-12-11 PROCEDURE — 93971 EXTREMITY STUDY: CPT

## 2020-12-11 NOTE — PROCEDURE
[FreeTextEntry1] : RLE venous US shows: Negative DVT. GSV closed s/p RFA. 2 areas of SVT noted along closed GSV.

## 2020-12-11 NOTE — HISTORY OF PRESENT ILLNESS
[FreeTextEntry1] : 70 y/o F w/hx HLD, HTN and Left intraductal papilloma s//p Left breast excisional biopsy s/p benign s/p right breast core needle biopsy pending results, RLE SVT resolved, R GSV reflux s/p RLE RFA on 12/7/2020 presents for follow up evaluation. Patient initially referred by Dr. Zurita for evaluation. Pt reports she has been feeling some mild discomfort over the RLE mid thigh, but is very happy with the outcome. Otherwise pt reports she has been staying active around the house. Denies: rest pain, ulcerations. \par \par SHx:phakectomy

## 2020-12-11 NOTE — ASSESSMENT
[FreeTextEntry1] : 72 y/o F with RLE SVT now resolved, R GSV reflux s/p RLE RFA on 12/7/2020 with recurrent RLE SVT  presents for follow up evaluation. On exam, R thigh: residual ecchymosis over the R mid thigh, palpable cords over the mid thigh tender to touch. \par RLE venous US shows: Negative DVT. GSV closed s/p RFA. 2 areas of SVT noted along closed GSV. \par \par Plan: \par - Apply warm compress / heating pad 3-4 times a day\par - Aleve or Tylenol PRN for pain\par - She will f/u with us here with repeat RLE scans in 1 month. \par - Patient to contact the office in case she develops any concerning symptoms. \par  [Arterial/Venous Disease] : arterial/venous disease

## 2020-12-11 NOTE — PHYSICAL EXAM
[Respiratory Effort] : normal respiratory effort [Normal Rate and Rhythm] : normal rate and rhythm [Varicose Veins Of Lower Extremities] : present [Varicose Veins Of The Right Leg] : of the right leg [Ankle Swelling On The Left] : moderate [Alert] : alert [Oriented to Person] : oriented to person [Oriented to Place] : oriented to place [Oriented to Time] : oriented to time [Calm] : calm [JVD] : no jugular venous distention  [Abdomen Tenderness] : ~T ~M No abdominal tenderness [de-identified] : Calm, cooperative [de-identified] : NC/AT [de-identified] : FROM + [de-identified] : R thigh: residual ecchymosis over the R mid thigh, palpable cords along closed GSV tender to touch.

## 2020-12-11 NOTE — ADDENDUM
[FreeTextEntry1] : This note was written by Jeaen Partida on 12/11/2020 acting as scribe for Hemant Waggoner M.D.\par \par I, Hemant Bunch have read and attest that all the information, medical decision making and discharge instructions within are true and accurate.

## 2021-01-11 ENCOUNTER — APPOINTMENT (OUTPATIENT)
Dept: VASCULAR SURGERY | Facility: CLINIC | Age: 71
End: 2021-01-11
Payer: MEDICARE

## 2021-01-11 PROCEDURE — 99212 OFFICE O/P EST SF 10 MIN: CPT

## 2021-01-11 NOTE — ADDENDUM
[FreeTextEntry1] : This note was written by Jeane Partida on 01/11/2021 acting as scribe for Hemant Waggoner M.D.\par \par I, Hemant Bunch have read and attest that all the information, medical decision making and discharge instructions within are true and accurate.

## 2021-01-11 NOTE — PHYSICAL EXAM
[Respiratory Effort] : normal respiratory effort [Normal Rate and Rhythm] : normal rate and rhythm [Alert] : alert [Oriented to Person] : oriented to person [Oriented to Place] : oriented to place [Oriented to Time] : oriented to time [Calm] : calm [2+] : left 2+ [Varicose Veins Of Lower Extremities] : bilaterally [Ankle Swelling On The Right] : mild [No Rash or Lesion] : No rash or lesion [JVD] : no jugular venous distention  [Abdomen Tenderness] : ~T ~M No abdominal tenderness [de-identified] : Calm, cooperative [de-identified] : NC/AT [de-identified] : FROM + [de-identified] : R thigh: puncture site healed with no evidence of active drainage, no surrounding erythema. Feet are pink, toes are warm to touch with adequate capillary refill.

## 2021-01-11 NOTE — HISTORY OF PRESENT ILLNESS
[FreeTextEntry1] : 69 y/o F w/hx HLD, HTN and Left intraductal papilloma s/p Left breast excisional biopsy s/p benign s/p right breast core needle biopsy pending results, RLE SVT resolved, R GSV reflux s/p RLE RFA on 12/7/2020 presents for follow up evaluation. Pt reports she has been feeling well overall today, she notice a small induration to the medial aspect of her R thigh but is non tender and has no surrounding erythema. She is very happy with the outcome and has been staying active with walking.  Denies: rest pain, ulcerations. \par

## 2021-01-26 ENCOUNTER — NON-APPOINTMENT (OUTPATIENT)
Age: 71
End: 2021-01-26

## 2021-02-08 ENCOUNTER — APPOINTMENT (OUTPATIENT)
Dept: BREAST CENTER | Facility: CLINIC | Age: 71
End: 2021-02-08
Payer: MEDICARE

## 2021-02-08 VITALS — BODY MASS INDEX: 34.96 KG/M2 | HEART RATE: 111 BPM | HEIGHT: 62 IN | WEIGHT: 190 LBS | OXYGEN SATURATION: 98 %

## 2021-02-08 PROCEDURE — 99213 OFFICE O/P EST LOW 20 MIN: CPT

## 2021-02-08 RX ORDER — VALACYCLOVIR 1 G/1
1 TABLET, FILM COATED ORAL
Qty: 20 | Refills: 0 | Status: ACTIVE | COMMUNITY
Start: 2020-09-23

## 2021-02-11 NOTE — DATA REVIEWED
[FreeTextEntry1] : 8/24/2020 ([facility]) bilateral screening mammogram showing scattered areas of fibroglandular density, 0.6cm mass with associated pleomorphic microcalcifications in LOQ right breast no sonographic correlate visualized recommended for stereotactic biopsy. Small group of probably benign microcalcifications 12:00 right breast anterior third one of which may be new from prior study, additional biopsy or wire localization should be based on pathology. Benign mammographic findings in left breast, negative left breast US. BIRADS 4B \par \par 9/21/2020 right breast LOQ mmt biopsy pathology intraductal papilloma with epithelial microcalcifications, breast tissue adjacent to papilloma shows fiber elastotic stroma and UDH \par

## 2021-02-11 NOTE — REASON FOR VISIT
[Follow-Up: _____] : a [unfilled] follow-up visit [FreeTextEntry1] : new right LOQ intraductal papilloma with possible adjacent sclerosing lesion, h/o left intraductal papilloma and ADH

## 2021-02-11 NOTE — PAST MEDICAL HISTORY
[Menarche Age ____] : age at menarche was [unfilled] [Menopause Age____] : age at menopause was [unfilled] [Total Preg ___] : G[unfilled] [Live Births ___] : P[unfilled]  [Age At Live Birth ___] : Age at live birth: [unfilled] [History of Hormone Replacement Treatment] : has no history of hormone replacement treatment [FreeTextEntry7] : none

## 2021-02-11 NOTE — HISTORY OF PRESENT ILLNESS
[FreeTextEntry1] : 69 yo female presents for follow up with new right LOQ intraductal papilloma found on screening mammogram s/p stereotactic biopsy on 9/21/2020; the findings of the biopsy are suggestive of a sclerosing lesion adjacent to the papilloma. She has a history of left intraductal papilloma s/p left breast excisional biopsy on 12/6/19 and history of left breast atypical ductal hyperplasia s/p excisional biopsy in 2007. An excisional biopsy of right breast was recommended given the new finding of an intraductal papilloma; however, Yovana is reluctant to proceed with surgery at this time secondary to the COVID-19 pandemic. Patient has no breast complaints today. Denies nipple discharge, nipple/breast skin changes or dimpling. Denies fever and chills.\par \par Of note she was caring for her mother who ultimately passed away in September 2021. She reports losing 10lbs over a 2 month period of time and reports hair loss starting in September 2021, she was referred to a hematologist/oncologist.  \par \par ETHAN lifetime risk of 42%.

## 2021-02-23 NOTE — ASU PATIENT PROFILE, ADULT - NUMBER OF YRS
Jasen,    Please reach out to the pharmacy to check on order as patient's Botox did not arrive    Thank you    Umberto Car 20

## 2021-03-10 ENCOUNTER — RESULT REVIEW (OUTPATIENT)
Age: 71
End: 2021-03-10

## 2021-03-10 ENCOUNTER — APPOINTMENT (OUTPATIENT)
Dept: MRI IMAGING | Facility: HOSPITAL | Age: 71
End: 2021-03-10

## 2021-03-10 ENCOUNTER — APPOINTMENT (OUTPATIENT)
Dept: MAMMOGRAPHY | Facility: HOSPITAL | Age: 71
End: 2021-03-10

## 2021-03-10 ENCOUNTER — OUTPATIENT (OUTPATIENT)
Dept: OUTPATIENT SERVICES | Facility: HOSPITAL | Age: 71
LOS: 1 days | End: 2021-03-10
Payer: MEDICARE

## 2021-03-10 ENCOUNTER — APPOINTMENT (OUTPATIENT)
Dept: ULTRASOUND IMAGING | Facility: HOSPITAL | Age: 71
End: 2021-03-10

## 2021-03-10 DIAGNOSIS — Z98.890 OTHER SPECIFIED POSTPROCEDURAL STATES: Chronic | ICD-10-CM

## 2021-03-10 DIAGNOSIS — H26.9 UNSPECIFIED CATARACT: Chronic | ICD-10-CM

## 2021-03-10 PROCEDURE — 77049 MRI BREAST C-+ W/CAD BI: CPT | Mod: 26,MG

## 2021-03-10 PROCEDURE — C8908: CPT

## 2021-03-10 PROCEDURE — G1004: CPT

## 2021-03-10 PROCEDURE — 76641 ULTRASOUND BREAST COMPLETE: CPT

## 2021-03-10 PROCEDURE — 76641 ULTRASOUND BREAST COMPLETE: CPT | Mod: 26,RT

## 2021-03-10 PROCEDURE — 77065 DX MAMMO INCL CAD UNI: CPT | Mod: 26,RT

## 2021-03-10 PROCEDURE — A9585: CPT

## 2021-03-10 PROCEDURE — G0279: CPT

## 2021-03-10 PROCEDURE — G0279: CPT | Mod: 26

## 2021-03-10 PROCEDURE — 77065 DX MAMMO INCL CAD UNI: CPT

## 2021-03-10 PROCEDURE — C8937: CPT

## 2021-03-16 DIAGNOSIS — R92.8 OTHER ABNORMAL AND INCONCLUSIVE FINDINGS ON DIAGNOSTIC IMAGING OF BREAST: ICD-10-CM

## 2021-03-16 DIAGNOSIS — N63.15 UNSPECIFIED LUMP IN THE RIGHT BREAST, OVERLAPPING QUADRANTS: ICD-10-CM

## 2021-03-19 ENCOUNTER — NON-APPOINTMENT (OUTPATIENT)
Age: 71
End: 2021-03-19

## 2021-09-09 ENCOUNTER — NON-APPOINTMENT (OUTPATIENT)
Age: 71
End: 2021-09-09

## 2021-09-13 ENCOUNTER — APPOINTMENT (OUTPATIENT)
Dept: BREAST CENTER | Facility: CLINIC | Age: 71
End: 2021-09-13
Payer: MEDICARE

## 2021-09-13 VITALS — HEART RATE: 68 BPM | BODY MASS INDEX: 33.13 KG/M2 | WEIGHT: 180 LBS | OXYGEN SATURATION: 95 % | HEIGHT: 62 IN

## 2021-09-13 PROCEDURE — 99213 OFFICE O/P EST LOW 20 MIN: CPT

## 2021-09-13 NOTE — PHYSICAL EXAM
[Normocephalic] : normocephalic [Atraumatic] : atraumatic [Supple] : supple [No Supraclavicular Adenopathy] : no supraclavicular adenopathy [Examined in the supine and seated position] : examined in the supine and seated position [No dominant masses] : no dominant masses in right breast  [No Nipple Retraction] : no left nipple retraction [No Nipple Discharge] : no left nipple discharge [No Axillary Lymphadenopathy] : no left axillary lymphadenopathy [No Edema] : no edema [No Rashes] : no rashes [No Ulceration] : no ulceration [de-identified] : Left breast with 1.5cm well circumscribed, mobile, round nodule at 9-10:00n9 and 5mm well circumscribed, mobile, round nodule in the inferior aspect of the lateral large incision scar

## 2021-09-13 NOTE — HISTORY OF PRESENT ILLNESS
[FreeTextEntry1] : 71 yo female presents for follow up with new right LOQ intraductal papilloma found on screening mammogram s/p stereotactic biopsy on 9/21/2020; the findings of the biopsy are suggestive of a sclerosing lesion adjacent to the papilloma. She has a history of left intraductal papilloma s/p left breast excisional biopsy on 12/6/19 and history of left breast atypical ductal hyperplasia s/p excisional biopsy in 2007. An excisional biopsy of right breast was recommended given the new finding of an intraductal papilloma; however, Yovana is reluctant to proceed with surgery at this time secondary to the COVID-19 pandemic. Patient has no breast complaints today. Denies nipple discharge, nipple/breast skin changes or dimpling. Denies fever and chills.\par \par Of note she was caring for her mother who ultimately passed away in September 2021. She reports losing 10lbs over a 2 month period of time and reports hair loss starting in September 2021, she was referred to a hematologist/oncologist.  \par \par ETHAN lifetime risk of 42%. \par \par Reviewed her imaging from Mar 2021, which was stable. Patient would like to continue with surveillance.

## 2021-09-13 NOTE — DATA REVIEWED
[FreeTextEntry1] : 8/24/2020 ([facility]) bilateral screening mammogram showing scattered areas of fibroglandular density, 0.6cm mass with associated pleomorphic microcalcifications in LOQ right breast no sonographic correlate visualized recommended for stereotactic biopsy. Small group of probably benign microcalcifications 12:00 right breast anterior third one of which may be new from prior study, additional biopsy or wire localization should be based on pathology. Benign mammographic findings in left breast, negative left breast US. BIRADS 4B \par \par 9/21/2020 right breast LOQ mmt biopsy pathology intraductal papilloma with epithelial microcalcifications, breast tissue adjacent to papilloma shows fiber elastotic stroma and UDH \par \par 3/10/2021 ([facility]) right mammogram/US showing Benign mammographic findings.\par 2. 3.6 x 1.6 x 2.5 cm oval parallel, circumscribed mixed isoechoic and hyperechoic lesion at the 3:00 position, 10 cm from the nipple, previously biopsied, pathology proven angiolipoma, unchanged, benign.\par RECOMMENDATION: Resume Annual Mammography on Schedule. BI-RADS 2\par \par 3/10/2021 ([facility]) bilateral breast MRI showing No MRI evidence of malignancy. RECOMMENDATION: MRI in 1 year. BI-RADS 2 \par

## 2021-09-20 ENCOUNTER — RESULT REVIEW (OUTPATIENT)
Age: 71
End: 2021-09-20

## 2021-09-20 ENCOUNTER — APPOINTMENT (OUTPATIENT)
Dept: ULTRASOUND IMAGING | Facility: HOSPITAL | Age: 71
End: 2021-09-20
Payer: MEDICARE

## 2021-09-20 ENCOUNTER — OUTPATIENT (OUTPATIENT)
Dept: OUTPATIENT SERVICES | Facility: HOSPITAL | Age: 71
LOS: 1 days | End: 2021-09-20
Payer: MEDICARE

## 2021-09-20 ENCOUNTER — APPOINTMENT (OUTPATIENT)
Dept: MAMMOGRAPHY | Facility: HOSPITAL | Age: 71
End: 2021-09-20
Payer: MEDICARE

## 2021-09-20 DIAGNOSIS — Z98.890 OTHER SPECIFIED POSTPROCEDURAL STATES: Chronic | ICD-10-CM

## 2021-09-20 DIAGNOSIS — H26.9 UNSPECIFIED CATARACT: Chronic | ICD-10-CM

## 2021-09-20 PROCEDURE — 76641 ULTRASOUND BREAST COMPLETE: CPT

## 2021-09-20 PROCEDURE — 77063 BREAST TOMOSYNTHESIS BI: CPT | Mod: 26

## 2021-09-20 PROCEDURE — 77067 SCR MAMMO BI INCL CAD: CPT

## 2021-09-20 PROCEDURE — 76641 ULTRASOUND BREAST COMPLETE: CPT | Mod: 26,50

## 2021-09-20 PROCEDURE — 77063 BREAST TOMOSYNTHESIS BI: CPT

## 2021-09-20 PROCEDURE — 77067 SCR MAMMO BI INCL CAD: CPT | Mod: 26

## 2021-09-23 ENCOUNTER — NON-APPOINTMENT (OUTPATIENT)
Age: 71
End: 2021-09-23

## 2022-02-24 ENCOUNTER — NON-APPOINTMENT (OUTPATIENT)
Age: 72
End: 2022-02-24

## 2022-02-28 ENCOUNTER — OUTPATIENT (OUTPATIENT)
Dept: OUTPATIENT SERVICES | Facility: HOSPITAL | Age: 72
LOS: 1 days | End: 2022-02-28
Payer: MEDICARE

## 2022-02-28 ENCOUNTER — RESULT REVIEW (OUTPATIENT)
Age: 72
End: 2022-02-28

## 2022-02-28 ENCOUNTER — APPOINTMENT (OUTPATIENT)
Dept: MRI IMAGING | Facility: HOSPITAL | Age: 72
End: 2022-02-28
Payer: MEDICARE

## 2022-02-28 DIAGNOSIS — Z98.890 OTHER SPECIFIED POSTPROCEDURAL STATES: Chronic | ICD-10-CM

## 2022-02-28 DIAGNOSIS — H26.9 UNSPECIFIED CATARACT: Chronic | ICD-10-CM

## 2022-02-28 PROCEDURE — 77049 MRI BREAST C-+ W/CAD BI: CPT | Mod: 26,MH

## 2022-02-28 PROCEDURE — C8908: CPT | Mod: MH

## 2022-02-28 PROCEDURE — A9585: CPT

## 2022-02-28 PROCEDURE — C8937: CPT

## 2022-03-10 ENCOUNTER — NON-APPOINTMENT (OUTPATIENT)
Age: 72
End: 2022-03-10

## 2022-03-30 ENCOUNTER — NON-APPOINTMENT (OUTPATIENT)
Age: 72
End: 2022-03-30

## 2022-04-04 ENCOUNTER — APPOINTMENT (OUTPATIENT)
Dept: BREAST CENTER | Facility: CLINIC | Age: 72
End: 2022-04-04
Payer: MEDICARE

## 2022-04-04 VITALS — BODY MASS INDEX: 34.04 KG/M2 | WEIGHT: 185 LBS | HEART RATE: 43 BPM | HEIGHT: 62 IN | OXYGEN SATURATION: 91 %

## 2022-04-04 PROCEDURE — 99213 OFFICE O/P EST LOW 20 MIN: CPT

## 2022-04-04 NOTE — DATA REVIEWED
[FreeTextEntry1] : 8/24/2020 ([facility]) bilateral screening mammogram showing scattered areas of fibroglandular density, 0.6cm mass with associated pleomorphic microcalcifications in LOQ right breast no sonographic correlate visualized recommended for stereotactic biopsy. Small group of probably benign microcalcifications 12:00 right breast anterior third one of which may be new from prior study, additional biopsy or wire localization should be based on pathology. Benign mammographic findings in left breast, negative left breast US. BIRADS 4B \par \par 9/21/2020 right breast LOQ mmt biopsy pathology intraductal papilloma with epithelial microcalcifications, breast tissue adjacent to papilloma shows fiber elastotic stroma and UDH \par \par 3/10/2021 ([facility]) right mammogram/US showing Benign mammographic findings.\par 2. 3.6 x 1.6 x 2.5 cm oval parallel, circumscribed mixed isoechoic and hyperechoic lesion at the 3:00 position, 10 cm from the nipple, previously biopsied, pathology proven angiolipoma, unchanged, benign.\par RECOMMENDATION: Resume Annual Mammography on Schedule. BI-RADS 2\par \par 3/10/2021 ([facility]) bilateral breast MRI showing No MRI evidence of malignancy. RECOMMENDATION: MRI in 1 year. BI-RADS 2 \par \par 9/20/21: B/L MG & US ([facility])- Fibroglandular. Stable postop changes. Stable asymmetries and scattered benign type calcs. US- R 0.4cm 8:00, 2FN complicated cyst. L 1:00, 8FN postop scarring. BIRADS 2\par \par 2/28/22 ([facility]) bilateral breast MRI showing AXILLA/OTHER: There is no significant axillary or internal mammary lymphadenopathy. IMPRESSION:No MRI evidence of malignancy. RECOMMENDATION: Resume Annual Mammography on Schedule. BI-RADS: BI-RADS 2 - Benign Finding

## 2022-04-04 NOTE — PHYSICAL EXAM
[Normocephalic] : normocephalic [Atraumatic] : atraumatic [Supple] : supple [No Supraclavicular Adenopathy] : no supraclavicular adenopathy [Examined in the supine and seated position] : examined in the supine and seated position [No dominant masses] : no dominant masses in right breast  [No Nipple Retraction] : no left nipple retraction [No Nipple Discharge] : no left nipple discharge [No Axillary Lymphadenopathy] : no left axillary lymphadenopathy [No Edema] : no edema [No Rashes] : no rashes [No Ulceration] : no ulceration [de-identified] : Left breast with 1.5cm well circumscribed, mobile, round nodule at 9-10:00n9 and 5mm well circumscribed, mobile, round nodule in the inferior aspect of the lateral large incision scar stable

## 2022-04-04 NOTE — HISTORY OF PRESENT ILLNESS
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 The lesion is undergoing monitoring per patient preference. Patient has no breast complaints today. Denies nipple discharge, nipple/breast skin changes or dimpling. Denies fever and chills.\par \par Of note she was caring for her mother who ultimately passed away in September 2021. She reports losing 10lbs over a 2 month period of time and reports hair loss starting in September 2021, she was referred to a hematologist/oncologist.  \par \par ETHAN lifetime risk of 42%. \par \par  She has a history of left intraductal papilloma s/p left breast excisional biopsy on 12/6/19 and history of left breast atypical ductal hyperplasia s/p excisional biopsy in 2007. An excisional biopsy of right breast was recommended given the new finding of an intraductal papilloma; however, Yovana is reluctant to proceed with surgery, still declining surgery. Have also recommended chemoprophylaxis in the past with a medical oncologist however the patient declined.\par \par Patient's PCP sent breast cancer markers Ca 15-3 35H and Ca 27-29 43H. Discussed that these are non specific and can be due to non cancerous breast changes which she has currently. Discussed that her most up to date breast imaging is benign. Recommended that she follow up with her pcp if he would like to trend those markers, patient states that she does not want these markers to be trended and she would like to continue imaging surveillance and conservative management at this time.

## 2022-09-23 ENCOUNTER — NON-APPOINTMENT (OUTPATIENT)
Age: 72
End: 2022-09-23

## 2022-10-03 ENCOUNTER — RESULT REVIEW (OUTPATIENT)
Age: 72
End: 2022-10-03

## 2022-10-03 ENCOUNTER — APPOINTMENT (OUTPATIENT)
Dept: ULTRASOUND IMAGING | Facility: HOSPITAL | Age: 72
End: 2022-10-03

## 2022-10-03 ENCOUNTER — APPOINTMENT (OUTPATIENT)
Dept: MAMMOGRAPHY | Facility: HOSPITAL | Age: 72
End: 2022-10-03

## 2022-10-03 ENCOUNTER — OUTPATIENT (OUTPATIENT)
Dept: OUTPATIENT SERVICES | Facility: HOSPITAL | Age: 72
LOS: 1 days | End: 2022-10-03
Payer: MEDICARE

## 2022-10-03 DIAGNOSIS — Z98.890 OTHER SPECIFIED POSTPROCEDURAL STATES: Chronic | ICD-10-CM

## 2022-10-03 DIAGNOSIS — H26.9 UNSPECIFIED CATARACT: Chronic | ICD-10-CM

## 2022-10-03 PROCEDURE — 77063 BREAST TOMOSYNTHESIS BI: CPT

## 2022-10-03 PROCEDURE — 77063 BREAST TOMOSYNTHESIS BI: CPT | Mod: 26

## 2022-10-03 PROCEDURE — 76641 ULTRASOUND BREAST COMPLETE: CPT

## 2022-10-03 PROCEDURE — 77067 SCR MAMMO BI INCL CAD: CPT

## 2022-10-03 PROCEDURE — 76641 ULTRASOUND BREAST COMPLETE: CPT | Mod: 26,50

## 2022-10-03 PROCEDURE — 77067 SCR MAMMO BI INCL CAD: CPT | Mod: 26

## 2022-10-06 ENCOUNTER — APPOINTMENT (OUTPATIENT)
Dept: BREAST CENTER | Facility: CLINIC | Age: 72
End: 2022-10-06

## 2022-10-06 VITALS — HEIGHT: 62 IN | WEIGHT: 183 LBS | BODY MASS INDEX: 33.68 KG/M2

## 2022-10-06 DIAGNOSIS — Z78.9 OTHER SPECIFIED HEALTH STATUS: ICD-10-CM

## 2022-10-06 DIAGNOSIS — M10.9 GOUT, UNSPECIFIED: ICD-10-CM

## 2022-10-06 PROCEDURE — 99213 OFFICE O/P EST LOW 20 MIN: CPT

## 2022-10-06 RX ORDER — ALLOPURINOL 200 MG/1
TABLET ORAL
Refills: 0 | Status: ACTIVE | COMMUNITY

## 2022-10-06 RX ORDER — ASPIRIN 81 MG
81 TABLET, DELAYED RELEASE (ENTERIC COATED) ORAL
Refills: 0 | Status: ACTIVE | COMMUNITY

## 2022-10-06 RX ORDER — AZITHROMYCIN 250 MG/1
250 TABLET, FILM COATED ORAL
Refills: 0 | Status: COMPLETED | COMMUNITY
Start: 2020-08-13 | End: 2022-10-06

## 2022-10-10 NOTE — PAST MEDICAL HISTORY
[Menarche Age ____] : age at menarche was [unfilled] [Menopause Age____] : age at menopause was [unfilled] [Total Preg ___] : G[unfilled] [Live Births ___] : P[unfilled]  [Age At Live Birth ___] : Age at live birth: [unfilled] [History of Hormone Replacement Treatment] : has no history of hormone replacement treatment [FreeTextEntry6] : no [FreeTextEntry7] : none [FreeTextEntry8] : yes

## 2022-10-10 NOTE — DATA REVIEWED
[FreeTextEntry1] : 8/24/2020 ([facility]) bilateral screening mammogram showing scattered areas of fibroglandular density, 0.6cm mass with associated pleomorphic microcalcifications in LOQ right breast no sonographic correlate visualized recommended for stereotactic biopsy. Small group of probably benign microcalcifications 12:00 right breast anterior third one of which may be new from prior study, additional biopsy or wire localization should be based on pathology. Benign mammographic findings in left breast, negative left breast US. BIRADS 4B \par \par 9/21/2020 right breast LOQ mmt biopsy pathology intraductal papilloma with epithelial microcalcifications, breast tissue adjacent to papilloma shows fiber elastotic stroma and UDH \par \par 3/10/2021 ([facility]) right mammogram/US showing Benign mammographic findings.\par 2. 3.6 x 1.6 x 2.5 cm oval parallel, circumscribed mixed isoechoic and hyperechoic lesion at the 3:00 position, 10 cm from the nipple, previously biopsied, pathology proven angiolipoma, unchanged, benign.\par RECOMMENDATION: Resume Annual Mammography on Schedule. BI-RADS 2\par \par 3/10/2021 ([facility]) bilateral breast MRI showing No MRI evidence of malignancy. RECOMMENDATION: MRI in 1 year. BI-RADS 2 \par \par 9/20/21: B/L MG & US ([facility])- Fibroglandular. Stable postop changes. Stable asymmetries and scattered benign type calcs. US- R 0.4cm 8:00, 2FN complicated cyst. L 1:00, 8FN postop scarring. BIRADS 2\par \par 2/28/22 ([facility]) bilateral breast MRI showing AXILLA/OTHER: There is no significant axillary or internal mammary lymphadenopathy. IMPRESSION:No MRI evidence of malignancy. RECOMMENDATION: Resume Annual Mammography on Schedule. BI-RADS: BI-RADS 2 - Benign Finding \par \par 10/3/2022 ([facility]) bilateral mammogram/US showing scattered areas of fibroglandular density, No suspicious mass, suspicious microcalcifications, or other sign of malignancy is identified. No mammographic or sonographic evidence of malignancy. RECOMMENDATION: Mammography in 1 year. BI-RADS 2 - Benign Finding

## 2022-10-10 NOTE — HISTORY OF PRESENT ILLNESS
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 The lesion is undergoing monitoring per patient preference. Patient has no breast complaints today. Denies nipple discharge, nipple/breast skin changes or dimpling. Denies fever and chills.\par \par Of note she was caring for her mother who ultimately passed away in September 2021. She reports losing 10lbs over a 2 month period of time and reports hair loss starting in September 2021, she was referred to a hematologist/oncologist.  \par \par ETHAN lifetime risk of 42%. \par \par She has a history of left intraductal papilloma s/p left breast excisional biopsy on 12/6/19 and history of left breast atypical ductal hyperplasia s/p excisional biopsy in 2007. An excisional biopsy of right breast was recommended given the new finding of an intraductal papilloma; however, Yovana is reluctant to proceed with surgery, still declining surgery. Have also recommended chemoprophylaxis in the past with a medical oncologist however the patient declined.\par

## 2022-10-10 NOTE — PHYSICAL EXAM
[Normocephalic] : normocephalic [Atraumatic] : atraumatic [Supple] : supple [No Supraclavicular Adenopathy] : no supraclavicular adenopathy [Examined in the supine and seated position] : examined in the supine and seated position [No dominant masses] : no dominant masses in right breast  [No Nipple Retraction] : no left nipple retraction [No Nipple Discharge] : no left nipple discharge [No Axillary Lymphadenopathy] : no left axillary lymphadenopathy [No Edema] : no edema [No Rashes] : no rashes [No Ulceration] : no ulceration [de-identified] : Left breast with 1.5cm well circumscribed, mobile, round nodule at 9-10:00n9 and 5mm well circumscribed, mobile, round nodule in the inferior aspect of the lateral large incision scar stable

## 2023-03-15 ENCOUNTER — OUTPATIENT (OUTPATIENT)
Dept: OUTPATIENT SERVICES | Facility: HOSPITAL | Age: 73
LOS: 1 days | End: 2023-03-15
Payer: MEDICARE

## 2023-03-15 ENCOUNTER — APPOINTMENT (OUTPATIENT)
Dept: MRI IMAGING | Facility: HOSPITAL | Age: 73
End: 2023-03-15
Payer: MEDICARE

## 2023-03-15 DIAGNOSIS — H26.9 UNSPECIFIED CATARACT: Chronic | ICD-10-CM

## 2023-03-15 DIAGNOSIS — Z98.890 OTHER SPECIFIED POSTPROCEDURAL STATES: Chronic | ICD-10-CM

## 2023-03-15 PROCEDURE — C8908: CPT

## 2023-03-15 PROCEDURE — A9585: CPT

## 2023-03-15 PROCEDURE — 77049 MRI BREAST C-+ W/CAD BI: CPT | Mod: 26

## 2023-03-15 PROCEDURE — C8937: CPT

## 2023-03-19 ENCOUNTER — TRANSCRIPTION ENCOUNTER (OUTPATIENT)
Age: 73
End: 2023-03-19

## 2023-03-21 ENCOUNTER — APPOINTMENT (OUTPATIENT)
Dept: BREAST CENTER | Facility: CLINIC | Age: 73
End: 2023-03-21
Payer: MEDICARE

## 2023-03-21 VITALS
WEIGHT: 189 LBS | BODY MASS INDEX: 34.78 KG/M2 | DIASTOLIC BLOOD PRESSURE: 82 MMHG | HEIGHT: 62 IN | HEART RATE: 83 BPM | SYSTOLIC BLOOD PRESSURE: 124 MMHG

## 2023-03-21 DIAGNOSIS — Z86.39 PERSONAL HISTORY OF OTHER ENDOCRINE, NUTRITIONAL AND METABOLIC DISEASE: ICD-10-CM

## 2023-03-21 DIAGNOSIS — Z86.79 PERSONAL HISTORY OF OTHER DISEASES OF THE CIRCULATORY SYSTEM: ICD-10-CM

## 2023-03-21 PROCEDURE — 99213 OFFICE O/P EST LOW 20 MIN: CPT

## 2023-03-26 NOTE — ASSESSMENT
[FreeTextEntry1] : 71 yo female previous patient of Dr. 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 The lesion is undergoing monitoring per patient preference. Most recent breast imaging B/L MRI on 3/15/23 BIRADS 2. Patient does not wish to undergo excision so will continue to monitor with imaging. Discussed with patient we will no longer proceed with routine breast MRIs, unless clinically indicated. Patient will have annual mammo/sono in Oct 2023 and reexamination with PA at this time. Patient verbalized understanding and agreement of plan.\par

## 2023-03-26 NOTE — DATA REVIEWED
[FreeTextEntry1] : 8/24/2020 ([facility]) bilateral screening mammogram showing scattered areas of fibroglandular density, 0.6cm mass with associated pleomorphic microcalcifications in LOQ right breast no sonographic correlate visualized recommended for stereotactic biopsy. Small group of probably benign microcalcifications 12:00 right breast anterior third one of which may be new from prior study, additional biopsy or wire localization should be based on pathology. Benign mammographic findings in left breast, negative left breast US. BIRADS 4B \par \par 9/21/2020 right breast LOQ mmt biopsy pathology intraductal papilloma with epithelial microcalcifications, breast tissue adjacent to papilloma shows fiber elastotic stroma and UDH \par \par 3/10/2021 ([facility]) right mammogram/US showing Benign mammographic findings.\par 2. 3.6 x 1.6 x 2.5 cm oval parallel, circumscribed mixed isoechoic and hyperechoic lesion at the 3:00 position, 10 cm from the nipple, previously biopsied, pathology proven angiolipoma, unchanged, benign.\par RECOMMENDATION: Resume Annual Mammography on Schedule. BI-RADS 2\par \par 3/10/2021 ([facility]) bilateral breast MRI showing No MRI evidence of malignancy. RECOMMENDATION: MRI in 1 year. BI-RADS 2 \par \par 9/20/21: B/L MG & US ([facility])- Fibroglandular. Stable postop changes. Stable asymmetries and scattered benign type calcs. US- R 0.4cm 8:00, 2FN complicated cyst. L 1:00, 8FN postop scarring. BIRADS 2\par \par 2/28/22 ([facility]) bilateral breast MRI showing AXILLA/OTHER: There is no significant axillary or internal mammary lymphadenopathy. IMPRESSION:No MRI evidence of malignancy. RECOMMENDATION: Resume Annual Mammography on Schedule. BI-RADS: BI-RADS 2 - Benign Finding \par \par 10/3/2022 ([facility]) bilateral mammogram/US showing scattered areas of fibroglandular density, No suspicious mass, suspicious microcalcifications, or other sign of malignancy is identified. No mammographic or sonographic evidence of malignancy. RECOMMENDATION: Mammography in 1 year. BI-RADS 2 - Benign Finding\par \par 3/15/23 B/L MRI ([facility]): No MRI evidence of malignancy. Resume Annual Mammography on Schedule. BIRADS 2.

## 2023-03-26 NOTE — PHYSICAL EXAM
[Normocephalic] : normocephalic [Atraumatic] : atraumatic [Supple] : supple [No Supraclavicular Adenopathy] : no supraclavicular adenopathy [Examined in the supine and seated position] : examined in the supine and seated position [No dominant masses] : no dominant masses in right breast  [No Nipple Retraction] : no left nipple retraction [No Nipple Discharge] : no left nipple discharge [No Axillary Lymphadenopathy] : no left axillary lymphadenopathy [No Edema] : no edema [No Rashes] : no rashes [No Ulceration] : no ulceration [de-identified] : Left breast with 1.5cm well circumscribed, mobile, round nodule at 9-10:00n9 and 5mm well circumscribed, mobile, round nodule in the inferior aspect of the lateral large incision scar stable

## 2023-03-26 NOTE — PAST MEDICAL HISTORY
[Menarche Age ____] : age at menarche was [unfilled] [Menopause Age____] : age at menopause was [unfilled] [Total Preg ___] : G[unfilled] [Live Births ___] : P[unfilled]  [Age At Live Birth ___] : Age at live birth: [unfilled] [History of Hormone Replacement Treatment] : has no history of hormone replacement treatment [FreeTextEntry7] : none [FreeTextEntry6] : no [FreeTextEntry8] : yes

## 2023-03-26 NOTE — HISTORY OF PRESENT ILLNESS
[FreeTextEntry1] : 71 yo female previous patient of Dr. 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 The lesion is undergoing monitoring per patient preference. Patient has no breast complaints today. Denies nipple discharge, nipple/breast skin changes or dimpling. Most recent breast imaging B/L MRI on 3/15/23 BIRADS 2. \par \par She has a history of left intraductal papilloma s/p left breast excisional biopsy on 12/6/19 and history of left breast atypical ductal hyperplasia s/p excisional biopsy in 2007. An excisional biopsy of right breast was recommended given the new finding of an intraductal papilloma; however, Yovana is reluctant to proceed with surgery and declined medical oncology consult. \par \par ETHAN lifetime risk of 42%. \par

## 2023-10-06 ENCOUNTER — RESULT REVIEW (OUTPATIENT)
Age: 73
End: 2023-10-06

## 2023-10-06 ENCOUNTER — APPOINTMENT (OUTPATIENT)
Dept: ULTRASOUND IMAGING | Facility: HOSPITAL | Age: 73
End: 2023-10-06
Payer: MEDICARE

## 2023-10-06 ENCOUNTER — OUTPATIENT (OUTPATIENT)
Dept: OUTPATIENT SERVICES | Facility: HOSPITAL | Age: 73
LOS: 1 days | End: 2023-10-06
Payer: MEDICARE

## 2023-10-06 ENCOUNTER — APPOINTMENT (OUTPATIENT)
Dept: MAMMOGRAPHY | Facility: HOSPITAL | Age: 73
End: 2023-10-06
Payer: MEDICARE

## 2023-10-06 DIAGNOSIS — H26.9 UNSPECIFIED CATARACT: Chronic | ICD-10-CM

## 2023-10-06 DIAGNOSIS — Z98.890 OTHER SPECIFIED POSTPROCEDURAL STATES: Chronic | ICD-10-CM

## 2023-10-06 PROCEDURE — 77067 SCR MAMMO BI INCL CAD: CPT | Mod: 26

## 2023-10-06 PROCEDURE — 76641 ULTRASOUND BREAST COMPLETE: CPT | Mod: 26,50,GZ

## 2023-10-06 PROCEDURE — 77063 BREAST TOMOSYNTHESIS BI: CPT | Mod: 26

## 2023-10-06 PROCEDURE — 77067 SCR MAMMO BI INCL CAD: CPT

## 2023-10-06 PROCEDURE — 76641 ULTRASOUND BREAST COMPLETE: CPT

## 2023-10-06 PROCEDURE — 77063 BREAST TOMOSYNTHESIS BI: CPT

## 2023-10-23 PROBLEM — D24.1 INTRADUCTAL PAPILLOMA OF RIGHT BREAST: Status: ACTIVE | Noted: 2021-02-08

## 2023-10-23 PROBLEM — D24.2 INTRADUCTAL PAPILLOMA OF LEFT BREAST: Status: ACTIVE | Noted: 2019-09-25

## 2023-10-23 PROBLEM — Z87.42 HISTORY OF ATYPICAL HYPERPLASIA OF BREAST: Status: ACTIVE | Noted: 2019-09-25

## 2023-10-23 PROBLEM — Z91.89 AT HIGH RISK FOR BREAST CANCER: Status: ACTIVE | Noted: 2021-02-08

## 2023-10-27 ENCOUNTER — APPOINTMENT (OUTPATIENT)
Dept: BREAST CENTER | Facility: CLINIC | Age: 73
End: 2023-10-27
Payer: MEDICARE

## 2023-10-27 VITALS
BODY MASS INDEX: 33.86 KG/M2 | OXYGEN SATURATION: 96 % | DIASTOLIC BLOOD PRESSURE: 88 MMHG | WEIGHT: 184 LBS | HEART RATE: 78 BPM | HEIGHT: 62 IN | SYSTOLIC BLOOD PRESSURE: 125 MMHG

## 2023-10-27 DIAGNOSIS — Z87.42 PERSONAL HISTORY OF OTHER DISEASES OF THE FEMALE GENITAL TRACT: ICD-10-CM

## 2023-10-27 DIAGNOSIS — D24.2 BENIGN NEOPLASM OF LEFT BREAST: ICD-10-CM

## 2023-10-27 DIAGNOSIS — D24.1 BENIGN NEOPLASM OF RIGHT BREAST: ICD-10-CM

## 2023-10-27 DIAGNOSIS — Z91.89 OTHER SPECIFIED PERSONAL RISK FACTORS, NOT ELSEWHERE CLASSIFIED: ICD-10-CM

## 2023-10-27 PROCEDURE — 99213 OFFICE O/P EST LOW 20 MIN: CPT

## 2024-10-07 ENCOUNTER — NON-APPOINTMENT (OUTPATIENT)
Age: 74
End: 2024-10-07

## 2024-10-16 ENCOUNTER — RESULT REVIEW (OUTPATIENT)
Age: 74
End: 2024-10-16

## 2024-10-16 ENCOUNTER — APPOINTMENT (OUTPATIENT)
Dept: MAMMOGRAPHY | Facility: HOSPITAL | Age: 74
End: 2024-10-16
Payer: MEDICARE

## 2024-10-16 ENCOUNTER — APPOINTMENT (OUTPATIENT)
Dept: ULTRASOUND IMAGING | Facility: HOSPITAL | Age: 74
End: 2024-10-16
Payer: MEDICARE

## 2024-10-16 ENCOUNTER — OUTPATIENT (OUTPATIENT)
Dept: OUTPATIENT SERVICES | Facility: HOSPITAL | Age: 74
LOS: 1 days | End: 2024-10-16
Payer: MEDICARE

## 2024-10-16 DIAGNOSIS — Z98.890 OTHER SPECIFIED POSTPROCEDURAL STATES: Chronic | ICD-10-CM

## 2024-10-16 DIAGNOSIS — H26.9 UNSPECIFIED CATARACT: Chronic | ICD-10-CM

## 2024-10-16 PROCEDURE — 76641 ULTRASOUND BREAST COMPLETE: CPT | Mod: 26,50,3G

## 2024-10-16 PROCEDURE — 77067 SCR MAMMO BI INCL CAD: CPT | Mod: 26

## 2024-10-16 PROCEDURE — 77063 BREAST TOMOSYNTHESIS BI: CPT | Mod: 26

## 2024-10-18 DIAGNOSIS — R92.323 MAMMOGRAPHIC FIBROGLANDULAR DENSITY, BILATERAL BREASTS: ICD-10-CM

## 2024-10-18 DIAGNOSIS — D17.9 BENIGN LIPOMATOUS NEOPLASM, UNSPECIFIED: ICD-10-CM

## 2024-10-18 DIAGNOSIS — N60.02 SOLITARY CYST OF LEFT BREAST: ICD-10-CM

## 2024-10-18 DIAGNOSIS — Z12.31 ENCOUNTER FOR SCREENING MAMMOGRAM FOR MALIGNANT NEOPLASM OF BREAST: ICD-10-CM

## 2024-10-18 DIAGNOSIS — N60.01 SOLITARY CYST OF RIGHT BREAST: ICD-10-CM

## 2024-11-20 PROCEDURE — 76641 ULTRASOUND BREAST COMPLETE: CPT

## 2024-11-20 PROCEDURE — 77063 BREAST TOMOSYNTHESIS BI: CPT

## 2024-11-20 PROCEDURE — 77067 SCR MAMMO BI INCL CAD: CPT

## 2024-12-04 ENCOUNTER — APPOINTMENT (OUTPATIENT)
Dept: BREAST CENTER | Facility: CLINIC | Age: 74
End: 2024-12-04

## 2024-12-04 ENCOUNTER — NON-APPOINTMENT (OUTPATIENT)
Age: 74
End: 2024-12-04

## 2024-12-04 VITALS — WEIGHT: 191 LBS | BODY MASS INDEX: 35.15 KG/M2 | HEIGHT: 62 IN

## 2024-12-04 DIAGNOSIS — Z87.42 PERSONAL HISTORY OF OTHER DISEASES OF THE FEMALE GENITAL TRACT: ICD-10-CM

## 2024-12-04 DIAGNOSIS — D24.2 BENIGN NEOPLASM OF LEFT BREAST: ICD-10-CM

## 2024-12-04 DIAGNOSIS — D24.1 BENIGN NEOPLASM OF RIGHT BREAST: ICD-10-CM

## 2024-12-04 DIAGNOSIS — Z91.89 OTHER SPECIFIED PERSONAL RISK FACTORS, NOT ELSEWHERE CLASSIFIED: ICD-10-CM

## 2024-12-04 PROCEDURE — 99213 OFFICE O/P EST LOW 20 MIN: CPT

## 2025-07-03 ENCOUNTER — NON-APPOINTMENT (OUTPATIENT)
Age: 75
End: 2025-07-03